# Patient Record
Sex: MALE | HISPANIC OR LATINO | Employment: UNEMPLOYED | URBAN - METROPOLITAN AREA
[De-identification: names, ages, dates, MRNs, and addresses within clinical notes are randomized per-mention and may not be internally consistent; named-entity substitution may affect disease eponyms.]

---

## 2022-01-13 ENCOUNTER — OFFICE VISIT (OUTPATIENT)
Dept: URGENT CARE | Facility: CLINIC | Age: 47
End: 2022-01-13

## 2022-01-13 VITALS
RESPIRATION RATE: 20 BRPM | SYSTOLIC BLOOD PRESSURE: 120 MMHG | BODY MASS INDEX: 23.07 KG/M2 | HEART RATE: 89 BPM | TEMPERATURE: 96.9 F | OXYGEN SATURATION: 100 % | HEIGHT: 67 IN | DIASTOLIC BLOOD PRESSURE: 68 MMHG | WEIGHT: 147 LBS

## 2022-01-13 DIAGNOSIS — T24.201A PARTIAL THICKNESS BURN OF RIGHT LOWER EXTREMITY, INITIAL ENCOUNTER: ICD-10-CM

## 2022-01-13 DIAGNOSIS — L08.9 LOCAL SKIN INFECTION: ICD-10-CM

## 2022-01-13 PROCEDURE — 90715 TDAP VACCINE 7 YRS/> IM: CPT | Performed by: NURSE PRACTITIONER

## 2022-01-13 PROCEDURE — 99203 OFFICE O/P NEW LOW 30 MIN: CPT | Mod: 25 | Performed by: NURSE PRACTITIONER

## 2022-01-13 PROCEDURE — 90471 IMMUNIZATION ADMIN: CPT | Performed by: NURSE PRACTITIONER

## 2022-01-13 RX ORDER — CEPHALEXIN 500 MG/1
500 CAPSULE ORAL 4 TIMES DAILY
Qty: 20 CAPSULE | Refills: 0 | Status: SHIPPED | OUTPATIENT
Start: 2022-01-13 | End: 2022-01-18

## 2022-01-13 NOTE — LETTER
January 13, 2022         Patient: Ravi Solitario   YOB: 1975   Date of Visit: 1/13/2022           To Whom it May Concern:    Ravi Solitario was seen in my clinic on 1/13/2022. He may be excused from work for the next two days.       If you have any questions or concerns, please don't hesitate to call.        Sincerely,           ANTHONY Tracey.  Electronically Signed

## 2022-01-13 NOTE — PROGRESS NOTES
Chief Complaint   Patient presents with   • Leg Pain     R leg pain       HISTORY OF PRESENT ILLNESS: Patient is a pleasant 46 y.o. male who presents to urgent care today with concerns of a burn. He accidentally burned his right knee on Sunday. He fell asleep in the sauna at the gym and notes steam burned his knee. He has been performing basic wound care but feels his pain has been worsening. Denies fever, chills, malaise. He is Arabic speaking, a  is used for the entire visit.     There are no problems to display for this patient.      Allergies:Patient has no known allergies.    Current Outpatient Medications Ordered in Epic   Medication Sig Dispense Refill   • mupirocin (BACTROBAN) 2 % Ointment Apply 1 Application topically 3 times a day for 10 days. 15 g 0   • cephALEXin (KEFLEX) 500 MG Cap Take 1 Capsule by mouth 4 times a day for 5 days. 20 Capsule 0     No current Epic-ordered facility-administered medications on file.       History reviewed. No pertinent past medical history.    Social History     Tobacco Use   • Smoking status: Never Smoker   • Smokeless tobacco: Not on file   Substance Use Topics   • Alcohol use: Yes     Comment: socially   • Drug use: No       No family status information on file.   History reviewed. No pertinent family history.    ROS:  Review of Systems   Constitutional: Negative for fever, chills, weight loss, malaise, and fatigue.   HENT: Negative for ear pain, nosebleeds, congestion, sore throat and neck pain.    Eyes: Negative for vision changes.   Neuro: Negative for headache, sensory changes, weakness, seizure, LOC.   Cardiovascular: Negative for chest pain, palpitations, orthopnea and leg swelling.   Respiratory: Negative for cough, sputum production, shortness of breath and wheezing.   Gastrointestinal: Negative for abdominal pain, nausea, vomiting or diarrhea.   Genitourinary: Negative for dysuria, urgency and frequency.  Musculoskeletal: Negative for  "falls, neck pain, back pain, joint pain, myalgias.   Skin: Positive for burn. Negative for rash, diaphoresis.     Exam:  /68 (BP Location: Left arm, Patient Position: Sitting, BP Cuff Size: Adult long)   Pulse 89   Temp 36.1 °C (96.9 °F)   Resp 20   Ht 1.69 m (5' 6.54\")   Wt 66.7 kg (147 lb)   SpO2 100%   General: well-nourished, well-developed male in NAD  Head: normocephalic, atraumatic  Eyes: PERRLA, no conjunctival injection, acuity grossly intact, lids normal.  Ears: normal shape and symmetry, no tenderness, no discharge. External canals are without any significant edema or erythema. Tympanic membranes are without any inflammation, no effusion. Gross auditory acuity is intact.  Nose: symmetrical without tenderness, no discharge.  Mouth/Throat: reasonable hygiene, no erythema, exudates or tonsillar enlargement.  Neck: no masses, range of motion within normal limits, no tracheal deviation. No obvious thyroid enlargement.   Lymph: no cervical adenopathy. No supraclavicular adenopathy.   Neuro: alert and oriented. Cranial nerves 1-12 grossly intact. No sensory deficit.   Cardiovascular: regular rate and rhythm. No edema.  Pulmonary: no distress. Chest is symmetrical with respiration, no wheezes, crackles, or rhonchi.   Musculoskeletal: no clubbing, appropriate muscle tone, gait is stable.  Skin: warm, no clubbing, no cyanosis, no rashes.  There is an oval partial-thickness burn noted to right medial knee measuring approximately 1 x 2 inches, approximately 1 similar surrounding erythema, no active drainage, no streaking.  Psych: appropriate mood, affect, judgement.         Assessment/Plan:  1. Partial thickness burn of right lower extremity, initial encounter  mupirocin (BACTROBAN) 2 % Ointment    cephALEXin (KEFLEX) 500 MG Cap    Tdap =>8yo IM   2. Local skin infection  mupirocin (BACTROBAN) 2 % Ointment    cephALEXin (KEFLEX) 500 MG Cap           Patient presents with partial-thickness burn and early " secondary infectious process.  Wound care discussed.  Keflex and Bactroban as directed.  Patient's tetanus is updated in clinic.  Supportive care, differential diagnoses, and indications for immediate follow-up discussed with patient.   Pathogenesis of diagnosis discussed including typical length and natural progression.   Instructed to return to clinic or nearest emergency department for any change in condition, further concerns, or worsening of symptoms.  Patient states understanding of the plan of care and discharge instructions.  Instructed to make an appointment, for follow up, with his primary care provider.        Please note that this dictation was created using voice recognition software. I have made every reasonable attempt to correct obvious errors, but I expect that there are errors of grammar and possibly content that I did not discover before finalizing the note.      ANTHONY Tracey.

## 2022-03-25 ENCOUNTER — OFFICE VISIT (OUTPATIENT)
Dept: URGENT CARE | Facility: CLINIC | Age: 47
End: 2022-03-25

## 2022-03-25 ENCOUNTER — HOSPITAL ENCOUNTER (OUTPATIENT)
Facility: MEDICAL CENTER | Age: 47
End: 2022-03-25
Attending: PHYSICIAN ASSISTANT

## 2022-03-25 VITALS
HEART RATE: 91 BPM | OXYGEN SATURATION: 99 % | RESPIRATION RATE: 16 BRPM | SYSTOLIC BLOOD PRESSURE: 120 MMHG | TEMPERATURE: 98.9 F | BODY MASS INDEX: 24.27 KG/M2 | HEIGHT: 66 IN | DIASTOLIC BLOOD PRESSURE: 76 MMHG | WEIGHT: 151 LBS

## 2022-03-25 DIAGNOSIS — B34.9 NONSPECIFIC SYNDROME SUGGESTIVE OF VIRAL ILLNESS: ICD-10-CM

## 2022-03-25 LAB
COVID ORDER STATUS COVID19: NORMAL
FLUAV+FLUBV AG SPEC QL IA: NEGATIVE
INT CON NEG: NEGATIVE
INT CON POS: POSITIVE

## 2022-03-25 PROCEDURE — 87804 INFLUENZA ASSAY W/OPTIC: CPT | Performed by: PHYSICIAN ASSISTANT

## 2022-03-25 PROCEDURE — 99213 OFFICE O/P EST LOW 20 MIN: CPT | Mod: CS | Performed by: PHYSICIAN ASSISTANT

## 2022-03-25 PROCEDURE — U0003 INFECTIOUS AGENT DETECTION BY NUCLEIC ACID (DNA OR RNA); SEVERE ACUTE RESPIRATORY SYNDROME CORONAVIRUS 2 (SARS-COV-2) (CORONAVIRUS DISEASE [COVID-19]), AMPLIFIED PROBE TECHNIQUE, MAKING USE OF HIGH THROUGHPUT TECHNOLOGIES AS DESCRIBED BY CMS-2020-01-R: HCPCS

## 2022-03-25 PROCEDURE — U0005 INFEC AGEN DETEC AMPLI PROBE: HCPCS

## 2022-03-25 RX ORDER — ONDANSETRON 4 MG/1
4 TABLET, ORALLY DISINTEGRATING ORAL EVERY 6 HOURS PRN
Qty: 15 TABLET | Refills: 0 | Status: SHIPPED | OUTPATIENT
Start: 2022-03-25

## 2022-03-25 RX ORDER — ONDANSETRON 4 MG/1
4 TABLET, ORALLY DISINTEGRATING ORAL ONCE
Status: COMPLETED | OUTPATIENT
Start: 2022-03-25 | End: 2022-03-25

## 2022-03-25 RX ADMIN — ONDANSETRON 4 MG: 4 TABLET, ORALLY DISINTEGRATING ORAL at 16:18

## 2022-03-25 ASSESSMENT — ENCOUNTER SYMPTOMS
ABDOMINAL PAIN: 1
HEARTBURN: 0
BLOOD IN STOOL: 0
CONSTIPATION: 0
VOMITING: 1
FEVER: 0
MYALGIAS: 0
DIARRHEA: 1
NAUSEA: 1

## 2022-03-25 NOTE — PROGRESS NOTES
"Subjective:   Ravi Solitario is a 47 y.o. male who presents for Nausea (N/V/D/HA x 2 days)      DENNIS is a pleasant 47-year-old male who presents with 2-day history of nausea, vomiting, diarrhea as well as headache.  Has not noticed any fevers.  1-2 episodes of vomiting per day with 3-4 episodes of nonbloody diarrhea.  No recent travel, no recent antibiotics.  Feels mildly improved today.  Tolerating liquids but has not had much of an appetite as a result has felt hunger pains in his stomach.    Review of Systems   Constitutional: Positive for malaise/fatigue. Negative for fever.   Gastrointestinal: Positive for abdominal pain, diarrhea, nausea and vomiting. Negative for blood in stool, constipation, heartburn and melena.   Musculoskeletal: Negative for myalgias.       Medications, Allergies, and current problem list reviewed today in Epic.     Objective:     /76 (BP Location: Left arm, Patient Position: Sitting, BP Cuff Size: Adult long)   Pulse 91   Temp 37.2 °C (98.9 °F) (Temporal)   Resp 16   Ht 1.676 m (5' 6\")   Wt 68.5 kg (151 lb)   SpO2 99%     Physical Exam  Vitals reviewed.   Constitutional:       Appearance: Normal appearance.   HENT:      Head: Normocephalic and atraumatic.      Right Ear: External ear normal.      Left Ear: External ear normal.      Nose: Nose normal.      Mouth/Throat:      Mouth: Mucous membranes are moist.   Eyes:      Conjunctiva/sclera: Conjunctivae normal.   Cardiovascular:      Rate and Rhythm: Normal rate and regular rhythm.   Pulmonary:      Effort: Pulmonary effort is normal.      Breath sounds: Normal breath sounds.   Abdominal:      Tenderness: There is no abdominal tenderness. There is no guarding or rebound.   Skin:     General: Skin is warm and dry.      Capillary Refill: Capillary refill takes less than 2 seconds.   Neurological:      Mental Status: He is alert and oriented to person, place, and time.         Assessment/Plan:     Diagnosis and associated " orders:     1. Nonspecific syndrome suggestive of viral illness  POCT Influenza A/B    COVID/SARS CoV-2 PCR    ondansetron (ZOFRAN ODT) 4 MG TABLET DISPERSIBLE    ondansetron (ZOFRAN ODT) dispertab 4 mg      Comments/MDM:     • Flu negative, Covid testing as a precaution, nontoxic male with no abdominal tenderness, low suspicion for any intra-abdominal pathology, likely infectious etiology not 1 requiring higher level care at this time.  Discussed supportive care, fluid hydration, gradual introduction of more solid foods.  Return if worsening.  Covid testing as a precaution, isolate until results available.  Professional  services used during this visit, iPad,  Teresa         Differential diagnosis, natural history, supportive care, and indications for immediate follow-up discussed.    Advised the patient to follow-up with the primary care physician for recheck, reevaluation, and consideration of further management.    Please note that this dictation was created using voice recognition software. I have made a reasonable attempt to correct obvious errors, but I expect that there are errors of grammar and possibly content that I did not discover before finalizing the note.    This note was electronically signed by Leonard Nunez PA-C

## 2022-03-26 LAB
SARS-COV-2 RNA RESP QL NAA+PROBE: NOTDETECTED
SPECIMEN SOURCE: NORMAL